# Patient Record
Sex: MALE | Race: WHITE | URBAN - METROPOLITAN AREA
[De-identification: names, ages, dates, MRNs, and addresses within clinical notes are randomized per-mention and may not be internally consistent; named-entity substitution may affect disease eponyms.]

---

## 2017-09-13 ENCOUNTER — OFFICE VISIT (OUTPATIENT)
Dept: OPTOMETRY | Facility: CLINIC | Age: 67
End: 2017-09-13
Payer: COMMERCIAL

## 2017-09-13 DIAGNOSIS — E11.9 TYPE 2 DIABETES MELLITUS WITHOUT RETINOPATHY (H): ICD-10-CM

## 2017-09-13 DIAGNOSIS — H52.4 ASTIGMATISM WITH PRESBYOPIA, BILATERAL: Primary | ICD-10-CM

## 2017-09-13 DIAGNOSIS — H52.203 ASTIGMATISM WITH PRESBYOPIA, BILATERAL: Primary | ICD-10-CM

## 2017-09-13 DIAGNOSIS — H25.043 POSTERIOR SUBCAPSULAR AGE-RELATED CATARACT OF BOTH EYES: ICD-10-CM

## 2017-09-13 DIAGNOSIS — H25.13 NUCLEAR SCLEROSIS OF BOTH EYES: ICD-10-CM

## 2017-09-13 PROCEDURE — 92015 DETERMINE REFRACTIVE STATE: CPT | Performed by: OPTOMETRIST

## 2017-09-13 PROCEDURE — 92004 COMPRE OPH EXAM NEW PT 1/>: CPT | Performed by: OPTOMETRIST

## 2017-09-13 ASSESSMENT — REFRACTION_MANIFEST
OD_SPHERE: -2.25
OS_SPHERE: -2.25
OD_AXIS: 180
METHOD_AUTOREFRACTION: 1
OD_CYLINDER: +2.50
OS_ADD: +2.75
OS_SPHERE: -2.00
OS_CYLINDER: +2.25
OS_CYLINDER: +2.25
OD_SPHERE: -2.00
OS_AXIS: 166
OD_CYLINDER: +2.50
OS_AXIS: 172
OD_ADD: +2.75
OD_AXIS: 001

## 2017-09-13 ASSESSMENT — VISUAL ACUITY
OS_SC: 20/40
OS_CC: 20/25
OD_SC: 20/50
OS_CC: 20/60
METHOD: SNELLEN - LINEAR
OD_CC: 20/30
CORRECTION_TYPE: GLASSES
OD_CC: 20/20
OS_CC+: -2

## 2017-09-13 ASSESSMENT — REFRACTION_WEARINGRX
OS_AXIS: 167
OD_SPHERE: -2.75
SPECS_TYPE: PAL
OS_SPHERE: -2.75
OD_CYLINDER: +2.50
OD_AXIS: 002
OD_ADD: +2.75
OS_CYLINDER: +2.50

## 2017-09-13 ASSESSMENT — TONOMETRY
OD_IOP_MMHG: 22
OS_IOP_MMHG: 22
IOP_METHOD: APPLANATION

## 2017-09-13 ASSESSMENT — SLIT LAMP EXAM - LIDS
COMMENTS: NORMAL
COMMENTS: NORMAL

## 2017-09-13 ASSESSMENT — CONF VISUAL FIELD
OD_NORMAL: 1
OS_NORMAL: 1
METHOD: COUNTING FINGERS

## 2017-09-13 ASSESSMENT — EXTERNAL EXAM - RIGHT EYE: OD_EXAM: NORMAL

## 2017-09-13 ASSESSMENT — CUP TO DISC RATIO
OD_RATIO: 0.2
OS_RATIO: 0.2

## 2017-09-13 ASSESSMENT — EXTERNAL EXAM - LEFT EYE: OS_EXAM: NORMAL

## 2017-09-13 NOTE — PROGRESS NOTES
Chief Complaint   Patient presents with     Eye Exam For Diabetes     Accompanied by wife  No results found for: A1C    Pt's does not know his last A1C - possibly 6. something  No results found for: A1C    Last Eye Exam: 9/2015  Dilated Previously: Yes    What are you currently using to see?  glasses    Distance Vision Acuity: Noticed gradual change in both eyes    Near Vision Acuity: Satisfied with vision while reading  with glasses    Eye Comfort: evening - eyes will get watery and burning - at end of day  Do you use eye drops? : No  Occupation or Hobbies: retired    Antonella Sanchez  [employer]       Medical, surgical and family histories reviewed and updated 9/13/2017.       OBJECTIVE: See Ophthalmology exam    ASSESSMENT:    ICD-10-CM    1. Astigmatism with presbyopia, bilateral H52.203 EYE EXAM (SIMPLE-NONBILLABLE)    H52.4 REFRACTION   2. Nuclear sclerosis of both eyes H25.13 EYE EXAM (SIMPLE-NONBILLABLE)   3. Posterior subcapsular age-related cataract of both eyes H25.043 EYE EXAM (SIMPLE-NONBILLABLE)   4. Type 2 diabetes mellitus without retinopathy (H) E11.9 EYE EXAM (SIMPLE-NONBILLABLE)      PLAN:    Eddie Villafuerte aware  eye exam results will be sent to No primary care provider on file..  Patient Instructions   There was no diabetic eye disease in either eye today. Keep blood sugar levels under good control and return yearly for eye exams.    There was a small change in the prescription for your glasses.    Thank you!

## 2017-09-13 NOTE — MR AVS SNAPSHOT
"              After Visit Summary   9/13/2017    Eddie Villafuerte    MRN: [id number]           Patient Information     Date Of Birth          1950        Visit Information        Provider Department      9/13/2017 12:20 PM Lorna Mcintosh OD [facility]        Today's Diagnoses     Astigmatism with presbyopia, bilateral    -  1    Nuclear sclerosis of both eyes        Posterior subcapsular age-related cataract of both eyes        Type 2 diabetes mellitus without retinopathy (H)          Care Instructions    There was no diabetic eye disease in either eye today. Keep blood sugar levels under good control and return yearly for eye exams.    There was a small change in the prescription for your glasses.    Thank you!          Follow-ups after your visit        Follow-up notes from your care team     Return in about 1 year (around 9/13/2018) for comprehensive eye exam.      Who to contact     If you have questions or need follow up information about today's clinic visit or your schedule please contact [facility] directly at [phone].  Normal or non-critical lab and imaging results will be communicated to you by Results Scorecardhart, letter or phone within 4 business days after the clinic has received the results. If you do not hear from us within 7 days, please contact the clinic through [employer] or phone. If you have a critical or abnormal lab result, we will notify you by phone as soon as possible.  Submit refill requests through [employer] or call your pharmacy and they will forward the refill request to us. Please allow 3 business days for your refill to be completed.          Additional Information About Your Visit        MyChart Information     [employer] lets you send messages to your doctor, view your test results, renew your prescriptions, schedule appointments and more. To sign up, go to [email or web address] . Click on \"Log in\" on the left side of the screen, which will " "take you to the Welcome page. Then click on \"Sign up Now\" on the right side of the page.     You will be asked to enter the access code listed below, as well as some personal information. Please follow the directions to create your username and password.     Your access code is: [email or web address]  Expires: 2017  1:42 PM     Your access code will  in 90 days. If you need help or a new code, please call your [place] clinic or [phone].        Care EveryWhere ID     This is your Care EveryWhere ID. This could be used by other organizations to access your [place] medical records  [id number]         Blood Pressure from Last 3 Encounters:   No data found for BP    Weight from Last 3 Encounters:   No data found for Wt              We Performed the Following     EYE EXAM (SIMPLE-NONBILLABLE)     REFRACTION        Primary Care Provider    None Specified       No primary provider on file.        Equal Access to Services     [facility]: Hadii lindy Burns, wayoselyn huffman, adiata kaalmafavian toney, elvis prabhakar . So [facility] [phone].    ATENCIÓN: Si habla español, tiene a sherman disposición servicios gratuitos de asistencia lingüística. Llame al [phone].    We comply with applicable federal civil rights laws and [place] laws. We do not discriminate on the basis of race, color, national origin, age, disability sex, sexual orientation or gender identity.            Thank you!     Thank you for choosing [facility]  for your care. Our goal is always to provide you with excellent care. Hearing back from our patients is one way we can continue to improve our services. Please take a few minutes to complete the written survey that you may receive in the mail after your visit with us. Thank you!             Your Updated Medication List - Protect others around you: Learn how to safely use, store and throw away your medicines at www.disposemymeds.org.        "   This list is accurate as of: 9/13/17  1:42 PM.  Always use your most recent med list.                   Brand Name Dispense Instructions for use Diagnosis    * UNKNOWN TO PATIENT      For blood pressure        * UNKNOWN TO PATIENT      statin        * UNKNOWN TO PATIENT      Blood pressure        * Notice:  This list has 3 medication(s) that are the same as other medications prescribed for you. Read the directions carefully, and ask your doctor or other care provider to review them with you.

## 2017-09-13 NOTE — PATIENT INSTRUCTIONS
There was no diabetic eye disease in either eye today. Keep blood sugar levels under good control and return yearly for eye exams.    There was a small change in the prescription for your glasses.    Thank you!

## 2017-09-27 ENCOUNTER — ALLIED HEALTH/NURSE VISIT (OUTPATIENT)
Dept: NURSING | Facility: CLINIC | Age: 67
End: 2017-09-27
Payer: COMMERCIAL

## 2017-09-27 DIAGNOSIS — Z23 NEED FOR PROPHYLACTIC VACCINATION AND INOCULATION AGAINST INFLUENZA: Primary | ICD-10-CM

## 2017-09-27 PROCEDURE — G0008 ADMIN INFLUENZA VIRUS VAC: HCPCS

## 2017-09-27 PROCEDURE — 90662 IIV NO PRSV INCREASED AG IM: CPT

## 2017-09-27 NOTE — MR AVS SNAPSHOT
"              After Visit Summary   2017    Eddie Villafuerte    MRN: [id number]           Patient Information     Date Of Birth          1950        Visit Information        Provider Department      2017 2:50 PM [facility]        Today's Diagnoses     Need for prophylactic vaccination and inoculation against influenza    -  1       Follow-ups after your visit        Who to contact     If you have questions or need follow up information about today's clinic visit or your schedule please contact [facility] directly at [phone].  Normal or non-critical lab and imaging results will be communicated to you by [employer] Concept[employer]art, letter or phone within 4 business days after the clinic has received the results. If you do not hear from us within 7 days, please contact the clinic through [employer] or phone. If you have a critical or abnormal lab result, we will notify you by phone as soon as possible.  Submit refill requests through [employer] or call your pharmacy and they will forward the refill request to us. Please allow 3 business days for your refill to be completed.          Additional Information About Your Visit        MyChart Information     [employer] lets you send messages to your doctor, view your test results, renew your prescriptions, schedule appointments and more. To sign up, go to [email or web address] . Click on \"Log in\" on the left side of the screen, which will take you to the Welcome page. Then click on \"Sign up Now\" on the right side of the page.     You will be asked to enter the access code listed below, as well as some personal information. Please follow the directions to create your username and password.     Your access code is: [email or web address]  Expires: 2017  1:42 PM     Your access code will  in 90 days. If you need help or a new code, please call your [facility] or [phone].        Care EveryWhere ID     This is your " Care EveryWhere ID. This could be used by other organizations to access your [place] medical records  [id number]         Blood Pressure from Last 3 Encounters:   No data found for BP    Weight from Last 3 Encounters:   No data found for Wt              We Performed the Following     ADMIN INFLUENZA (For MEDICARE Patients ONLY) []     FLU VACCINE, INCREASED ANTIGEN, PRESV FREE, AGE 65+ [09011]        Primary Care Provider    None Specified       No primary provider on file.        Equal Access to Services     GENEVA TRUJILLO : Hadii aad ku hadasho Soomaali, waaxda luqadaha, qaybta kaalmada adekobyyada, elvis davis esmerjordy adekoby hudson lamikejordy . So [facility] [phone].    ATENCIÓN: Si tylerla all, tiene a sherman disposición servicios gratuitos de asistencia lingüística. Llame al [phone].    We comply with applicable federal civil rights laws and [place] laws. We do not discriminate on the basis of race, color, national origin, age, disability sex, sexual orientation or gender identity.            Thank you!     Thank you for choosing [facility]  for your care. Our goal is always to provide you with excellent care. Hearing back from our patients is one way we can continue to improve our services. Please take a few minutes to complete the written survey that you may receive in the mail after your visit with us. Thank you!             Your Updated Medication List - Protect others around you: Learn how to safely use, store and throw away your medicines at www.disposemymeds.org.          This list is accurate as of: 9/27/17  2:59 PM.  Always use your most recent med list.                   Brand Name Dispense Instructions for use Diagnosis    * UNKNOWN TO PATIENT      For blood pressure        * UNKNOWN TO PATIENT      statin        * UNKNOWN TO PATIENT      Blood pressure        * Notice:  This list has 3 medication(s) that are the same as other medications prescribed for you. Read the directions  carefully, and ask your doctor or other care provider to review them with you.